# Patient Record
Sex: FEMALE | Race: WHITE | Employment: UNEMPLOYED | URBAN - METROPOLITAN AREA
[De-identification: names, ages, dates, MRNs, and addresses within clinical notes are randomized per-mention and may not be internally consistent; named-entity substitution may affect disease eponyms.]

---

## 2020-12-04 ENCOUNTER — HOSPITAL ENCOUNTER (EMERGENCY)
Age: 25
Discharge: HOME | End: 2020-12-04
Attending: EMERGENCY MEDICINE

## 2020-12-04 VITALS
TEMPERATURE: 98.1 F | RESPIRATION RATE: 17 BRPM | BODY MASS INDEX: 38.64 KG/M2 | DIASTOLIC BLOOD PRESSURE: 83 MMHG | SYSTOLIC BLOOD PRESSURE: 128 MMHG | HEART RATE: 96 BPM | OXYGEN SATURATION: 95 % | WEIGHT: 210 LBS | HEIGHT: 62 IN

## 2020-12-04 LAB
ABSOLUTE EOS #: 0 K/UL (ref 0–0.4)
ABSOLUTE IMMATURE GRANULOCYTE: ABNORMAL K/UL (ref 0–0.3)
ABSOLUTE LYMPH #: 1.8 K/UL (ref 1–4.8)
ABSOLUTE MONO #: 0.5 K/UL (ref 0.1–1.3)
ALBUMIN SERPL-MCNC: 4.8 G/DL (ref 3.5–5.2)
ALBUMIN/GLOBULIN RATIO: NORMAL (ref 1–2.5)
ALP BLD-CCNC: 84 U/L (ref 35–104)
ALT SERPL-CCNC: 22 U/L (ref 5–33)
ANION GAP SERPL CALCULATED.3IONS-SCNC: 16 MMOL/L (ref 9–17)
AST SERPL-CCNC: 17 U/L
BASOPHILS # BLD: 1 % (ref 0–2)
BASOPHILS ABSOLUTE: 0.1 K/UL (ref 0–0.2)
BILIRUB SERPL-MCNC: 0.62 MG/DL (ref 0.3–1.2)
BUN BLDV-MCNC: 14 MG/DL (ref 6–20)
BUN/CREAT BLD: NORMAL (ref 9–20)
C-REACTIVE PROTEIN: 2.4 MG/L (ref 0–5)
CALCIUM SERPL-MCNC: 9.6 MG/DL (ref 8.6–10.4)
CHLORIDE BLD-SCNC: 102 MMOL/L (ref 98–107)
CO2: 20 MMOL/L (ref 20–31)
CREAT SERPL-MCNC: 0.79 MG/DL (ref 0.5–0.9)
DIFFERENTIAL TYPE: ABNORMAL
DIRECT EXAM: NORMAL
EOSINOPHILS RELATIVE PERCENT: 0 % (ref 0–4)
GFR AFRICAN AMERICAN: >60 ML/MIN
GFR NON-AFRICAN AMERICAN: >60 ML/MIN
GFR SERPL CREATININE-BSD FRML MDRD: NORMAL ML/MIN/{1.73_M2}
GFR SERPL CREATININE-BSD FRML MDRD: NORMAL ML/MIN/{1.73_M2}
GLUCOSE BLD-MCNC: 85 MG/DL (ref 70–99)
HCG QUALITATIVE: NEGATIVE
HCT VFR BLD CALC: 45.6 % (ref 36–46)
HEMOGLOBIN: 15.9 G/DL (ref 12–16)
IMMATURE GRANULOCYTES: ABNORMAL %
LACTATE DEHYDROGENASE: 168 U/L (ref 135–214)
LIPASE: 16 U/L (ref 13–60)
LYMPHOCYTES # BLD: 20 % (ref 24–44)
Lab: NORMAL
MAGNESIUM: 2.1 MG/DL (ref 1.6–2.6)
MCH RBC QN AUTO: 29.8 PG (ref 26–34)
MCHC RBC AUTO-ENTMCNC: 35 G/DL (ref 31–37)
MCV RBC AUTO: 85.2 FL (ref 80–100)
MONOCYTES # BLD: 6 % (ref 1–7)
NRBC AUTOMATED: ABNORMAL PER 100 WBC
PDW BLD-RTO: 13.2 % (ref 11.5–14.9)
PLATELET # BLD: 202 K/UL (ref 150–450)
PLATELET ESTIMATE: ABNORMAL
PMV BLD AUTO: 9 FL (ref 6–12)
POTASSIUM SERPL-SCNC: 4 MMOL/L (ref 3.7–5.3)
RBC # BLD: 5.35 M/UL (ref 4–5.2)
RBC # BLD: ABNORMAL 10*6/UL
SEG NEUTROPHILS: 73 % (ref 36–66)
SEGMENTED NEUTROPHILS ABSOLUTE COUNT: 6.7 K/UL (ref 1.3–9.1)
SODIUM BLD-SCNC: 138 MMOL/L (ref 135–144)
SPECIMEN DESCRIPTION: NORMAL
TOTAL PROTEIN: 8.3 G/DL (ref 6.4–8.3)
WBC # BLD: 9 K/UL (ref 3.5–11)
WBC # BLD: ABNORMAL 10*3/UL

## 2020-12-04 PROCEDURE — 83690 ASSAY OF LIPASE: CPT

## 2020-12-04 PROCEDURE — 80053 COMPREHEN METABOLIC PANEL: CPT

## 2020-12-04 PROCEDURE — 87804 INFLUENZA ASSAY W/OPTIC: CPT

## 2020-12-04 PROCEDURE — 99285 EMERGENCY DEPT VISIT HI MDM: CPT

## 2020-12-04 PROCEDURE — 84703 CHORIONIC GONADOTROPIN ASSAY: CPT

## 2020-12-04 PROCEDURE — 83735 ASSAY OF MAGNESIUM: CPT

## 2020-12-04 PROCEDURE — 6370000000 HC RX 637 (ALT 250 FOR IP): Performed by: EMERGENCY MEDICINE

## 2020-12-04 PROCEDURE — 2580000003 HC RX 258: Performed by: EMERGENCY MEDICINE

## 2020-12-04 PROCEDURE — 6360000002 HC RX W HCPCS: Performed by: EMERGENCY MEDICINE

## 2020-12-04 PROCEDURE — 36415 COLL VENOUS BLD VENIPUNCTURE: CPT

## 2020-12-04 PROCEDURE — 83615 LACTATE (LD) (LDH) ENZYME: CPT

## 2020-12-04 PROCEDURE — 86140 C-REACTIVE PROTEIN: CPT

## 2020-12-04 PROCEDURE — 85025 COMPLETE CBC W/AUTO DIFF WBC: CPT

## 2020-12-04 PROCEDURE — 96374 THER/PROPH/DIAG INJ IV PUSH: CPT

## 2020-12-04 RX ORDER — ONDANSETRON 4 MG/1
4 TABLET, ORALLY DISINTEGRATING ORAL EVERY 8 HOURS PRN
Qty: 20 TABLET | Refills: 0 | Status: SHIPPED | OUTPATIENT
Start: 2020-12-04

## 2020-12-04 RX ORDER — ONDANSETRON 2 MG/ML
4 INJECTION INTRAMUSCULAR; INTRAVENOUS ONCE
Status: COMPLETED | OUTPATIENT
Start: 2020-12-04 | End: 2020-12-04

## 2020-12-04 RX ORDER — ACETAMINOPHEN 500 MG
1000 TABLET ORAL ONCE
Status: COMPLETED | OUTPATIENT
Start: 2020-12-04 | End: 2020-12-04

## 2020-12-04 RX ORDER — 0.9 % SODIUM CHLORIDE 0.9 %
1000 INTRAVENOUS SOLUTION INTRAVENOUS ONCE
Status: COMPLETED | OUTPATIENT
Start: 2020-12-04 | End: 2020-12-04

## 2020-12-04 RX ADMIN — ONDANSETRON 4 MG: 2 INJECTION INTRAMUSCULAR; INTRAVENOUS at 19:39

## 2020-12-04 RX ADMIN — SODIUM CHLORIDE 1000 ML: 9 INJECTION, SOLUTION INTRAVENOUS at 19:39

## 2020-12-04 RX ADMIN — ACETAMINOPHEN 1000 MG: 500 TABLET, FILM COATED ORAL at 21:31

## 2020-12-04 ASSESSMENT — ENCOUNTER SYMPTOMS
COUGH: 0
ABDOMINAL PAIN: 0
DIARRHEA: 1
CONSTIPATION: 0
SORE THROAT: 0
NAUSEA: 1
TROUBLE SWALLOWING: 0
COLOR CHANGE: 0
VOMITING: 1
BACK PAIN: 0
SHORTNESS OF BREATH: 0
BLOOD IN STOOL: 0

## 2020-12-04 ASSESSMENT — PAIN SCALES - GENERAL: PAINLEVEL_OUTOF10: 5

## 2020-12-04 NOTE — ED PROVIDER NOTES
[address]  EMERGENCY DEPARTMENT ENCOUNTER    Pt Name: Radha Price  MRN: [id number]  YOB: 1995  Date of evaluation:12/4/20  PCP: No primary care provider on file. CHIEF COMPLAINT       Chief Complaint   Patient presents with    Nausea    Emesis    Diarrhea       HISTORY OF PRESENT ILLNESS    Radha Price is a 31 y.o. female who presents with a chief complaint of nausea, vomiting and diarrhea. Patient states she is been sick for about 5 days. Denies any abdominal pain. Emesis and diarrhea has been nonbloody. She reports a mild headache as well. No fevers or chills. No chest pain, cough or difficulty breathing. States she has not been around anybody in several weeks. No nasal congestion, sore throat. She did not get a flu shot this year. Denies any myalgias but states that she feels like she has \"the flu. \"  Symptoms are acute. Symptoms are moderate. Nothing make symptoms better or worse. Patient has no other complaints at this time. REVIEW OF SYSTEMS       Review of Systems   Constitutional: Negative for chills, fatigue and fever. HENT: Negative for congestion, ear pain, sore throat and trouble swallowing. Eyes: Negative for visual disturbance. Respiratory: Negative for cough and shortness of breath. Cardiovascular: Negative for chest pain, palpitations and leg swelling. Gastrointestinal: Positive for diarrhea, nausea and vomiting. Negative for abdominal pain, blood in stool and constipation. Genitourinary: Negative for dysuria and flank pain. Musculoskeletal: Negative for arthralgias, back pain, myalgias and neck pain. Skin: Negative for color change, rash and wound. Neurological: Positive for headaches. Negative for dizziness, weakness, light-headedness and numbness. Psychiatric/Behavioral: Negative for confusion. All other systems reviewed and are negative. Negative in 10 essential Systems except as mentioned above and in the HPI.         PAST MEDICAL HISTORY   History reviewed. No pertinent past medical history. None    SURGICAL HISTORY      has a past surgical history that includes Tonsillectomy. None    CURRENT MEDICATIONS       Previous Medications    No medications on file       ALLERGIES     is allergic to asa [aspirin]. FAMILY HISTORY     has no family status information on file. Noncontributory  family history is not on file. SOCIAL HISTORY      reports that she has quit smoking. She has never used smokeless tobacco. She reports previous alcohol use. She reports that she does not use drugs. PHYSICAL EXAM     INITIAL VITALS:  height is 5' 2\" (1.575 m) and weight is 210 lb (95.3 kg). Her oral temperature is 98.1 °F (36.7 °C). Her blood pressure is 128/83 and her pulse is 96. Her respiration is 17 and oxygen saturation is 95%. Physical Exam  Vitals signs and nursing note reviewed. Constitutional:       General: She is not in acute distress. HENT:      Head: Normocephalic and atraumatic. Eyes:      Conjunctiva/sclera: Conjunctivae normal.      Pupils: Pupils are equal, round, and reactive to light. Neck:      Musculoskeletal: Neck supple. Cardiovascular:      Rate and Rhythm: Normal rate and regular rhythm. Heart sounds: Normal heart sounds. No murmur. Pulmonary:      Effort: Pulmonary effort is normal. No respiratory distress. Breath sounds: Normal breath sounds. Abdominal:      General: Bowel sounds are normal. There is no distension. Palpations: Abdomen is soft. Tenderness: There is no abdominal tenderness. Musculoskeletal:         General: No tenderness. Lymphadenopathy:      Cervical: No cervical adenopathy. Skin:     General: Skin is warm and dry. Findings: No rash. Neurological:      Mental Status: She is alert and oriented to person, place, and time.    Psychiatric:         Judgment: Judgment normal.           DIFFERENTIAL DIAGNOSIS/MDM:   79-year-old female presents with 5 days of nausea, vomiting diarrhea. She is afebrile, nontoxic, normal vital signs here. No acute distress. On my exam she has no abdominal tenderness. Suspect most likely viral syndrome, gastroenteritis. I doubt appendicitis, biliary colic, cholecystitis or pancreatitis. We will give IV fluids, antiemetics and reassess. DIAGNOSTIC RESULTS     EKG: All EKG's are interpreted by the Emergency Department Physician who either signs or Co-signs this chart in the absence of a cardiologist.        RADIOLOGY:   I directly visualized the following  images and reviewed the radiologist interpretations:  No orders to display           ED BEDSIDE ULTRASOUND:      LABS:  Labs Reviewed   CBC WITH AUTO DIFFERENTIAL - Abnormal; Notable for the following components:       Result Value    RBC 5.35 (*)     Seg Neutrophils 73 (*)     Lymphocytes 20 (*)     All other components within normal limits   RAPID INFLUENZA A/B ANTIGENS   COMPREHENSIVE METABOLIC PANEL   LIPASE   LACTATE DEHYDROGENASE   C-REACTIVE PROTEIN   HCG, SERUM, QUALITATIVE   MAGNESIUM   URINE RT REFLEX TO CULTURE         EMERGENCY DEPARTMENT COURSE:   Vitals:    Vitals:    12/04/20 1824   BP: 128/83   Pulse: 96   Resp: 17   Temp: 98.1 °F (36.7 °C)   TempSrc: Oral   SpO2: 95%   Weight: 210 lb (95.3 kg)   Height: 5' 2\" (1.575 m)     10:23 PM EST  Work-up is mostly unremarkable here. Lab work is completely normal.  She is tolerating orals at this time. Likely with a viral gastroenteritis. Very low suspicion for Covid however patient understands is still a possibility. We will discharge home with short prescription of Zofran. Advised to keep her well-hydrated. Advised to return if any symptoms worsen she is agreeable with plan will be discharged home today. CRITICALCARE:      CONSULTS:  None      PROCEDURES:      FINAL IMPRESSION      1.  Viral gastroenteritis            DISPOSITION/PLAN   DISPOSITION Decision To Discharge 12/04/2020 09:30:52 PM          PATIENT REFERRED TO:  [facility] ED  [address] [facility]  [phone]    As needed, If symptoms worsen      DISCHARGE MEDICATIONS:  New Prescriptions    ONDANSETRON (ZOFRAN ODT) 4 MG DISINTEGRATING TABLET    Take 1 tablet by mouth every 8 hours as needed for Nausea       (Please note that portions ofthis note were completed with a voice recognition program.  Efforts were made to edit the dictations but occasionally words are mis-transcribed.)    Daily Velasco DO  Attending Emergency Physician          Daily Velasco DO  12/04/20 4120

## 2020-12-05 NOTE — ED NOTES
Mode of arrival (squad #, walk in, police, etc) : walk in        Chief complaint(s): c/o nausea, emesis and diarrhea        Arrival Note (brief scenario, treatment PTA, etc). : Pt presents to ED from home c/o nausea, emesis and diarrhea x 5days. Pt stated she has not been able to keep anything down including liquids. Pt is A&Ox4, eupneic, PWD. GCS=15. Call light in reach. C= \"Have you ever felt that you should Cut down on your drinking? \"  No  A= \"Have people Annoyed you by criticizing your drinking? \"  No  G= \"Have you ever felt bad or Guilty about your drinking? \"  No  E= \"Have you ever had a drink as an Eye-opener first thing in the morning to steady your nerves or to help a hangover? \"  No      Deferred []      Reason for deferring: N/A    *If yes to two or more: probable alcohol abuse. Anish Briceno RN  12/04/20 3637

## 2020-12-05 NOTE — ED NOTES
Report given to [facility], RN from ED. Report method in person   The following was reviewed with receiving RN:   Current vital signs:  /83   Pulse 96   Temp 98.1 °F (36.7 °C) (Oral)   Resp 17   Ht 5' 2\" (1.575 m)   Wt 210 lb (95.3 kg)   LMP 11/13/2020   SpO2 95%   BMI 38.41 kg/m²                MEWS Score: 1     Any medication or safety alerts were reviewed. Any pending diagnostics and notifications were also reviewed, as well as any safety concerns or issues, abnormal labs, abnormal imaging, and abnormal assessment findings. Questions were answered.             Maggy Vasquez RN  12/04/20 1393

## 2020-12-05 NOTE — ED NOTES
Pt stated she is nausea free and has developed a headache. RN to notify Dr. Rama Shetty.       Nydia Ceron RN  12/04/20 7289

## 2023-04-05 ENCOUNTER — HOSPITAL ENCOUNTER (INPATIENT)
Age: 28
LOS: 2 days | Discharge: HOME | End: 2023-04-07
Attending: INTERNAL MEDICINE
Payer: COMMERCIAL

## 2023-04-05 ENCOUNTER — APPOINTMENT (OUTPATIENT)
Dept: GENERAL RADIOLOGY | Age: 28
End: 2023-04-05
Attending: INTERNAL MEDICINE
Payer: COMMERCIAL

## 2023-04-05 PROBLEM — D68.00 VON WILLEBRAND DISEASE (HCC): Status: ACTIVE | Noted: 2023-04-05

## 2023-04-05 PROBLEM — G35 MULTIPLE SCLEROSIS (HCC): Status: RESOLVED | Noted: 2023-04-05 | Resolved: 2023-04-05

## 2023-04-05 PROBLEM — Z22.7 TB LUNG, LATENT: Status: ACTIVE | Noted: 2023-04-05

## 2023-04-05 PROBLEM — M27.40 MAXILLARY CYST: Status: ACTIVE | Noted: 2023-04-05

## 2023-04-05 PROBLEM — G35 EXACERBATION OF MULTIPLE SCLEROSIS (HCC): Status: ACTIVE | Noted: 2023-04-05

## 2023-04-05 PROBLEM — G35 MULTIPLE SCLEROSIS (HCC): Status: ACTIVE | Noted: 2023-04-05

## 2023-04-05 PROBLEM — G35 EXACERBATION OF MULTIPLE SCLEROSIS (HCC): Status: RESOLVED | Noted: 2023-04-05 | Resolved: 2023-04-05

## 2023-04-05 LAB
ALBUMIN SERPL-MCNC: 4 G/DL (ref 3.5–5.2)
ANION GAP SERPL CALCULATED.3IONS-SCNC: 9 MMOL/L (ref 9–17)
BUN SERPL-MCNC: 10 MG/DL (ref 6–20)
CALCIUM SERPL-MCNC: 8.8 MG/DL (ref 8.6–10.4)
CHLORIDE SERPL-SCNC: 107 MMOL/L (ref 98–107)
CO2 SERPL-SCNC: 21 MMOL/L (ref 20–31)
CREAT SERPL-MCNC: 0.49 MG/DL (ref 0.5–0.9)
GFR SERPL CREATININE-BSD FRML MDRD: >60 ML/MIN/1.73M2
GLUCOSE SERPL-MCNC: 137 MG/DL (ref 70–99)
HCT VFR BLD AUTO: 42 % (ref 36.3–47.1)
HGB BLD-MCNC: 13.8 G/DL (ref 11.9–15.1)
MAGNESIUM SERPL-MCNC: 2.1 MG/DL (ref 1.6–2.6)
MCH RBC QN AUTO: 28.8 PG (ref 25.2–33.5)
MCHC RBC AUTO-ENTMCNC: 32.9 G/DL (ref 28.4–34.8)
MCV RBC AUTO: 87.5 FL (ref 82.6–102.9)
NRBC AUTOMATED: 0 PER 100 WBC
PDW BLD-RTO: 12.5 % (ref 11.8–14.4)
PHOSPHATE SERPL-MCNC: 2.6 MG/DL (ref 2.6–4.5)
PLATELET # BLD AUTO: 198 K/UL (ref 138–453)
PMV BLD AUTO: 11 FL (ref 8.1–13.5)
POTASSIUM SERPL-SCNC: 3.8 MMOL/L (ref 3.7–5.3)
RBC # BLD: 4.8 M/UL (ref 3.95–5.11)
SODIUM SERPL-SCNC: 137 MMOL/L (ref 135–144)
WBC # BLD AUTO: 7.4 K/UL (ref 3.5–11.3)

## 2023-04-05 PROCEDURE — 6360000002 HC RX W HCPCS

## 2023-04-05 PROCEDURE — 2580000003 HC RX 258

## 2023-04-05 PROCEDURE — 2060000000 HC ICU INTERMEDIATE R&B

## 2023-04-05 PROCEDURE — 85027 COMPLETE CBC AUTOMATED: CPT

## 2023-04-05 PROCEDURE — 2580000003 HC RX 258: Performed by: INTERNAL MEDICINE

## 2023-04-05 PROCEDURE — 80069 RENAL FUNCTION PANEL: CPT

## 2023-04-05 PROCEDURE — 99223 1ST HOSP IP/OBS HIGH 75: CPT | Performed by: PSYCHIATRY & NEUROLOGY

## 2023-04-05 PROCEDURE — 71045 X-RAY EXAM CHEST 1 VIEW: CPT

## 2023-04-05 PROCEDURE — 6370000000 HC RX 637 (ALT 250 FOR IP): Performed by: INTERNAL MEDICINE

## 2023-04-05 PROCEDURE — 83735 ASSAY OF MAGNESIUM: CPT

## 2023-04-05 PROCEDURE — 99285 EMERGENCY DEPT VISIT HI MDM: CPT

## 2023-04-05 RX ORDER — SODIUM CHLORIDE 0.9 % (FLUSH) 0.9 %
5-40 SYRINGE (ML) INJECTION EVERY 12 HOURS SCHEDULED
Status: DISCONTINUED | OUTPATIENT
Start: 2023-04-05 | End: 2023-04-07 | Stop reason: HOSPADM

## 2023-04-05 RX ORDER — ONDANSETRON 4 MG/1
4 TABLET, ORALLY DISINTEGRATING ORAL EVERY 8 HOURS PRN
Status: DISCONTINUED | OUTPATIENT
Start: 2023-04-05 | End: 2023-04-07 | Stop reason: HOSPADM

## 2023-04-05 RX ORDER — ACETAMINOPHEN 325 MG/1
650 TABLET ORAL EVERY 6 HOURS PRN
Status: DISCONTINUED | OUTPATIENT
Start: 2023-04-05 | End: 2023-04-07 | Stop reason: HOSPADM

## 2023-04-05 RX ORDER — SODIUM CHLORIDE 9 MG/ML
INJECTION, SOLUTION INTRAVENOUS PRN
Status: DISCONTINUED | OUTPATIENT
Start: 2023-04-05 | End: 2023-04-07 | Stop reason: HOSPADM

## 2023-04-05 RX ORDER — ACETAMINOPHEN 650 MG/1
650 SUPPOSITORY RECTAL EVERY 6 HOURS PRN
Status: DISCONTINUED | OUTPATIENT
Start: 2023-04-05 | End: 2023-04-07 | Stop reason: HOSPADM

## 2023-04-05 RX ORDER — SODIUM CHLORIDE 0.9 % (FLUSH) 0.9 %
5-40 SYRINGE (ML) INJECTION PRN
Status: DISCONTINUED | OUTPATIENT
Start: 2023-04-05 | End: 2023-04-07 | Stop reason: HOSPADM

## 2023-04-05 RX ORDER — POLYETHYLENE GLYCOL 3350 17 G/17G
17 POWDER, FOR SOLUTION ORAL DAILY PRN
Status: DISCONTINUED | OUTPATIENT
Start: 2023-04-05 | End: 2023-04-07 | Stop reason: HOSPADM

## 2023-04-05 RX ORDER — ONDANSETRON 2 MG/ML
4 INJECTION INTRAMUSCULAR; INTRAVENOUS EVERY 6 HOURS PRN
Status: DISCONTINUED | OUTPATIENT
Start: 2023-04-05 | End: 2023-04-07 | Stop reason: HOSPADM

## 2023-04-05 RX ORDER — ENOXAPARIN SODIUM 100 MG/ML
40 INJECTION SUBCUTANEOUS DAILY
Status: DISCONTINUED | OUTPATIENT
Start: 2023-04-05 | End: 2023-04-07 | Stop reason: HOSPADM

## 2023-04-05 RX ADMIN — SODIUM CHLORIDE, PRESERVATIVE FREE 10 ML: 5 INJECTION INTRAVENOUS at 20:05

## 2023-04-05 RX ADMIN — ACETAMINOPHEN 650 MG: 325 TABLET ORAL at 18:34

## 2023-04-05 RX ADMIN — METHYLPREDNISOLONE SODIUM SUCCINATE 1000 MG: 1 INJECTION, POWDER, LYOPHILIZED, FOR SOLUTION INTRAMUSCULAR; INTRAVENOUS at 17:50

## 2023-04-05 RX ADMIN — SODIUM CHLORIDE: 9 INJECTION, SOLUTION INTRAVENOUS at 17:49

## 2023-04-05 ASSESSMENT — ENCOUNTER SYMPTOMS
PHOTOPHOBIA: 0
ABDOMINAL DISTENTION: 0
RHINORRHEA: 0
NAUSEA: 1
BACK PAIN: 0
SORE THROAT: 0
ABDOMINAL PAIN: 0
SHORTNESS OF BREATH: 0
CHOKING: 0
COLOR CHANGE: 0
DIARRHEA: 0
CONSTIPATION: 0
WHEEZING: 0
COUGH: 0

## 2023-04-05 ASSESSMENT — PAIN SCALES - GENERAL
PAINLEVEL_OUTOF10: 4
PAINLEVEL_OUTOF10: 0
PAINLEVEL_OUTOF10: 2
PAINLEVEL_OUTOF10: 4

## 2023-04-05 ASSESSMENT — PAIN DESCRIPTION - LOCATION
LOCATION: ABDOMEN
LOCATION: HEAD
LOCATION: GENERALIZED

## 2023-04-05 ASSESSMENT — PAIN DESCRIPTION - ONSET: ONSET: ON-GOING

## 2023-04-05 ASSESSMENT — PAIN - FUNCTIONAL ASSESSMENT: PAIN_FUNCTIONAL_ASSESSMENT: 0-10

## 2023-04-05 ASSESSMENT — PAIN DESCRIPTION - FREQUENCY
FREQUENCY: INTERMITTENT
FREQUENCY: INTERMITTENT

## 2023-04-05 ASSESSMENT — PAIN DESCRIPTION - PAIN TYPE
TYPE: ACUTE PAIN
TYPE: ACUTE PAIN

## 2023-04-05 ASSESSMENT — PAIN DESCRIPTION - DESCRIPTORS
DESCRIPTORS: PRESSURE
DESCRIPTORS: ACHING

## 2023-04-05 ASSESSMENT — PAIN DESCRIPTION - ORIENTATION
ORIENTATION: POSTERIOR
ORIENTATION: ANTERIOR

## 2023-04-05 NOTE — CONSULTS
[facility]  Office: [address], DO, Karri Hernandez, DO, Deborah July, DO, Reny Hernandez Blood, DO, Blake Estrada MD, Alejandra Maurice MD, Raquel Hairston MD, Sylvie Bernheim, MD,  Julianna Novka MD, Olivia Cornelius MD, Jacqueline Burch, DO, Henny Martin MD,  Jessica Bergman MD, Virgil Carlos MD, Isabella Fenton DO, Lyla Velez MD, Eric Andrews MD, Sadia Garza DO, Migue Pettit MD, Bala Dolan MD, Kiara Sinclair MD, Maddy Marquez MD,  Chioma Gracia DO, Harper Nam MD,  Kelly Ramos CNP,  Becka Callejas, CNP, Ubaldo Azar, CNP, Alfa Joyce, CNP,  Edith Bird, DNP, Jayro Vargas, CNP, Nelly Morrow, CNP, Russel Rojo, CNP, Karen Garcia, CNP, Jeanie Gonzalez, CNP, Tony Yancey PA-C, Christina Wells, CNS, Nallely Del Toro, CNP, Cecile Sood, CNP         [address] / HISTORY AND PHYSICAL EXAMINATION            Date:   4/5/2023  Patient name:  Armin Cintron  Date of admission:  4/5/2023  2:11 PM  MRN:   [id number]  Account:  [de-identified]  YOB: 1995  PCP:    No primary care provider on file. Room:   [address] [place] [address]  Code Status:    Full Code    Physician Requesting Consult: Jacqueline Burch DO    Reason for Consult:  medical management. Chief Complaint:     Chief Complaint   Patient presents with    Multiple Sclerosis    Fatigue       History Obtained From:     patient    History of Present Illness:     79-year-old female presents to the emergency department for weakness that started the previous night. Patient was seen at an outside emergency room and transferred for close evaluation by neurology. Patient has been following with [facility] for her MS, after speak with neurology service was recommended transfer after initiation of steroids.   Patient upon arrival has drastically improved symptoms, patient initially was lethargic and tired, however now

## 2023-04-05 NOTE — ED NOTES
Pt to ED as transfer from [facility] for MS flare-up. Pt reports weakness in all extremities that started last night. Patient alert and oriented x4, talking in complete sentences. Respirations even and unlabored. Patient placed on continuous cardiac monitoring, BP cuff, and pulse ox.  Call light in reach, all needs met at this time     Christy Ross RN  04/05/23 3621

## 2023-04-05 NOTE — H&P
y.o. Kenziedanial Lugolem / non  female past medical history of MS on Ocrevus, von Willebrand factor disease, latent TB, presented with generalized fatigue, worsening lower extremity weakness, extremity numbness, syncope, vertigo over the last 3 days. Impression: Possible pseudoexacerbation, syncope    Plan:     IV Solu-Medrol 1 g IV for 3 days  Routine EEG to rule out seizure  Tylenol as needed for headache  Consider using magnesium for headache as well  We will hold off on imaging at this moment      Consultations:   [place] TO DUAEN TEAM      Follow-up further recommendations after discussing the case with attending  The plan was discussed with the patient, patient's family and the medical staff. Patient is admitted as inpatient status because of co-morbidities listed above, severity of signs and symptoms as outlined, requirement for current medical therapies and most importantly because of direct risk to patient if care not provided in a hospital setting. Burgess Faraz MD  Neurology Resident PGY-2  4/5/2023  7:23 PM    Copy sent to Dr. Cardoza primary care provider on file.

## 2023-04-05 NOTE — ED NOTES
Pt transported to  in stretcher by [employer], [address] [facility], RN  04/05/23 1568

## 2023-04-06 PROBLEM — R55 VASOVAGAL SYNCOPE: Status: ACTIVE | Noted: 2023-04-06

## 2023-04-06 LAB
25(OH)D3 SERPL-MCNC: 8 NG/ML
FOLATE SERPL-MCNC: 13 NG/ML
VIT B12 SERPL-MCNC: 345 PG/ML (ref 232–1245)

## 2023-04-06 PROCEDURE — 2580000003 HC RX 258: Performed by: INTERNAL MEDICINE

## 2023-04-06 PROCEDURE — 6360000002 HC RX W HCPCS

## 2023-04-06 PROCEDURE — 82607 VITAMIN B-12: CPT

## 2023-04-06 PROCEDURE — 82306 VITAMIN D 25 HYDROXY: CPT

## 2023-04-06 PROCEDURE — 99232 SBSQ HOSP IP/OBS MODERATE 35: CPT | Performed by: PSYCHIATRY & NEUROLOGY

## 2023-04-06 PROCEDURE — 95816 EEG AWAKE AND DROWSY: CPT

## 2023-04-06 PROCEDURE — 6370000000 HC RX 637 (ALT 250 FOR IP): Performed by: INTERNAL MEDICINE

## 2023-04-06 PROCEDURE — 2060000000 HC ICU INTERMEDIATE R&B

## 2023-04-06 PROCEDURE — 36415 COLL VENOUS BLD VENIPUNCTURE: CPT

## 2023-04-06 PROCEDURE — 99232 SBSQ HOSP IP/OBS MODERATE 35: CPT | Performed by: INTERNAL MEDICINE

## 2023-04-06 PROCEDURE — 82746 ASSAY OF FOLIC ACID SERUM: CPT

## 2023-04-06 PROCEDURE — 2580000003 HC RX 258

## 2023-04-06 RX ORDER — BISACODYL 5 MG/1
5 TABLET, DELAYED RELEASE ORAL DAILY PRN
Status: DISCONTINUED | OUTPATIENT
Start: 2023-04-06 | End: 2023-04-07 | Stop reason: HOSPADM

## 2023-04-06 RX ORDER — DOCUSATE SODIUM 100 MG/1
100 CAPSULE, LIQUID FILLED ORAL 2 TIMES DAILY
Status: DISCONTINUED | OUTPATIENT
Start: 2023-04-06 | End: 2023-04-07 | Stop reason: HOSPADM

## 2023-04-06 RX ORDER — MAGNESIUM SULFATE 1 G/100ML
1000 INJECTION INTRAVENOUS ONCE
Status: COMPLETED | OUTPATIENT
Start: 2023-04-06 | End: 2023-04-06

## 2023-04-06 RX ADMIN — SODIUM CHLORIDE: 9 INJECTION, SOLUTION INTRAVENOUS at 11:52

## 2023-04-06 RX ADMIN — SODIUM CHLORIDE: 9 INJECTION, SOLUTION INTRAVENOUS at 07:57

## 2023-04-06 RX ADMIN — METHYLPREDNISOLONE SODIUM SUCCINATE 1000 MG: 1 INJECTION, POWDER, LYOPHILIZED, FOR SOLUTION INTRAMUSCULAR; INTRAVENOUS at 07:58

## 2023-04-06 RX ADMIN — SODIUM CHLORIDE, PRESERVATIVE FREE 10 ML: 5 INJECTION INTRAVENOUS at 07:58

## 2023-04-06 RX ADMIN — SODIUM CHLORIDE, PRESERVATIVE FREE 10 ML: 5 INJECTION INTRAVENOUS at 20:54

## 2023-04-06 RX ADMIN — DOCUSATE SODIUM 100 MG: 100 CAPSULE ORAL at 20:21

## 2023-04-06 RX ADMIN — MAGNESIUM SULFATE HEPTAHYDRATE 1000 MG: 1 INJECTION, SOLUTION INTRAVENOUS at 11:52

## 2023-04-06 RX ADMIN — DOCUSATE SODIUM 100 MG: 100 CAPSULE ORAL at 14:01

## 2023-04-06 ASSESSMENT — ENCOUNTER SYMPTOMS
SHORTNESS OF BREATH: 0
ABDOMINAL DISTENTION: 0
PHOTOPHOBIA: 0
WHEEZING: 0
COUGH: 0
COLOR CHANGE: 0
CONSTIPATION: 0
CHOKING: 0
BACK PAIN: 0
SORE THROAT: 0
NAUSEA: 1
RHINORRHEA: 0
DIARRHEA: 0
ABDOMINAL PAIN: 0

## 2023-04-06 ASSESSMENT — PAIN DESCRIPTION - DESCRIPTORS
DESCRIPTORS: ACHING
DESCRIPTORS: ACHING

## 2023-04-06 ASSESSMENT — PAIN DESCRIPTION - LOCATION
LOCATION: HEAD
LOCATION: HEAD

## 2023-04-06 ASSESSMENT — PAIN SCALES - GENERAL: PAINLEVEL_OUTOF10: 4

## 2023-04-06 NOTE — PROCEDURES
EEG REPORT       Patient: Delos Cooks Age: 28 y.o. MRN: [id number]      Referring Provider: Aurelio Day MD    History: This routine 30 minute scalp EEG was recorded with video- monitoring for a 28 y.o. Maribel Bañuelos female  who presented with history of loss of consciousness. This EEG was performed to evaluate for focal and epileptiform abnormalities. Delos Cooks   Current Facility-Administered Medications   Medication Dose Route Frequency Provider Last Rate Last Admin    magnesium sulfate 1000 mg in dextrose 5% 100 mL IVPB  1,000 mg IntraVENous Once Cindi Maya MD        sodium chloride flush 0.9 % injection 5-40 mL  5-40 mL IntraVENous 2 times per day Pamla Spare, DO   10 mL at 04/06/23 0758    sodium chloride flush 0.9 % injection 5-40 mL  5-40 mL IntraVENous PRN Pamla Spare, DO        0.9 % sodium chloride infusion   IntraVENous PRN Pamla Spare, DO 10 mL/hr at 04/06/23 0757 New Bag at 04/06/23 0757    enoxaparin (LOVENOX) injection 40 mg  40 mg SubCUTAneous Daily Pamla Spare, DO        ondansetron (ZOFRAN-ODT) disintegrating tablet 4 mg  4 mg Oral Q8H PRN Pamla Spare, DO        Or    ondansetron [facility]) injection 4 mg  4 mg IntraVENous Q6H PRN Pamla Spare, DO        polyethylene glycol (GLYCOLAX) packet 17 g  17 g Oral Daily PRN Pamla Spare, DO        acetaminophen (TYLENOL) tablet 650 mg  650 mg Oral Q6H PRN Pamla Spare, DO   650 mg at 04/05/23 9259    Or    acetaminophen (TYLENOL) suppository 650 mg  650 mg Rectal Q6H PRN Pamla Spare, DO        methylPREDNISolone sodium (SOLU-MEDROL) 1,000 mg in sodium chloride 0.9 % 250 mL IVPB  1,000 mg IntraVENous Daily Cindi Maya MD   Stopped at 04/06/23 9142        Technical Description: This is a 21 channel digital EEG recording with time-locked video. Electrodes were placed in accordance with the 10-20 International System of Electrode Placement. Single lead EKG monitoring as well as temporal electrodes were included. The patient was not sleep deprived.  This

## 2023-04-06 NOTE — PROGRESS NOTES
[facility] Neurology   [address] [place] [address]    Progress Note             Date:   4/6/2023  Patient name:  Lisy Branch  Date of admission:  4/5/2023  2:11 PM  MRN:   [id number]  Account:  [de-identified]  YOB: 1995  PCP:    No primary care provider on file. Room:   [address] [place] [address]  Code Status:    Full Code    Chief Complaint:     Chief Complaint   Patient presents with    Multiple Sclerosis    Fatigue       Interval hx: The patient was seen and examined at bedside. Is vitally stable, alert and oriented x 3. No acute events overnight. The patient stated that she is feeling much better today  Numbness of bilateral lower extremity has improved   Still has tingling of hands bilaterally       Brief History of Present Illness: This is a 26-year-old female past medical history of multiple sclerosis, von Willebrand factor disease, latent TB, presented as a transfer from [facility] for possible MS flare. Patient presented with feeling generalized fatigue, lethargic, she also complained of bilateral lower extremity weakness and numbness over the last 3 days. She noticed symptoms are more notable on the left side lower extremity. She lives with her fiancé and her sister and states that she had loss of consciousness about 6 times according to them prior to presentation, also reports having spinning sensation for which she was laying on her back for the whole day, complain of headache 4/10, dull, nonradiating, partially relieved with Tylenol, patient is unable to take NSAIDs due to risk of bleeding since she has von Willebrand factor disease. Also states having nausea,but has not vomited. She denies any fever or chills, denies any dysuria, urgency or frequency, denies URTI symptoms. Initial vital signs at [facility] with blood pressure 115/95, afebrile. CBC, CMP, UA, lactic acid all were within normal limits.   CT head showing changes consistent with myelinating

## 2023-04-06 NOTE — PROGRESS NOTES
[facility]  Office: [address], DO, Jean Vasquez, DO, Deidra Garner, DO, Thurmond Matsmoira Ocampo, DO, Velton Habermann, MD, Estephania Murdock MD, Kelsey Celestin MD, Roselyn Figueroa MD,  Lissa Griffiths MD, Michelle Joseph MD, Constance Gibson, DO, Stephanie Clancy MD,  Ivette Batista MD, Alice Kat MD, Remigio Jacobson DO, Kervin Jenkins MD, Yolande Lofton MD, Sandy Aldridge DO, Jagruti Gurrola MD, Stef Hoang MD, Adilia Somers MD, Eladio Byrd MD,  Fili Bejarano, DO, Maddie Aranda MD,  Prudencio Mclean, CNP,  Harreit Sanchez, CNP, Edie Rhodes, CNP, Lynnette Bates, CNP,  Serena Haddad, [facility], Simi Fletcher, CNP, Bryan Orozco, CNP, Russel Brown, CNP, Kimmie Garcia, CNP, Tanesha Sandoval, CNP, Alli Blanco, PA-C, Ubaldo Horton, CNS, Toni Drew, CNP, Lucille Yen, CNP         [address]    Progress Note    4/6/2023    1:17 PM    Name:   Sherri Jackson  MRN:     [id number]     Acct:      [de-identified]   Room:   [address] [place] [address] Day:  1  Admit Date:  4/5/2023  2:11 PM    PCP:   No primary care provider on file. Code Status:  Full Code    Subjective:     C/C:   Chief Complaint   Patient presents with    Multiple Sclerosis    Fatigue     Interval History Status: improved. Feels better today after starting steroids    Denies cp/sob/n/v    Had 4-5 episodes brief loc at home while straining to have bm-has been constipated    Brief History:     Per my partner:  \"28year-old female presents to the emergency department for weakness that started the previous night. Patient was seen at an outside emergency room and transferred for close evaluation by neurology. Patient has been following with [facility] for her MS, after speak with neurology service was recommended transfer after initiation of steroids.   Patient upon arrival has drastically improved symptoms, patient initially was lethargic and tired, however

## 2023-04-07 VITALS
RESPIRATION RATE: 13 BRPM | SYSTOLIC BLOOD PRESSURE: 101 MMHG | HEART RATE: 100 BPM | BODY MASS INDEX: 38.09 KG/M2 | TEMPERATURE: 99.1 F | WEIGHT: 207 LBS | HEIGHT: 62 IN | OXYGEN SATURATION: 100 % | DIASTOLIC BLOOD PRESSURE: 59 MMHG

## 2023-04-07 PROCEDURE — 2580000003 HC RX 258

## 2023-04-07 PROCEDURE — 99239 HOSP IP/OBS DSCHRG MGMT >30: CPT | Performed by: PSYCHIATRY & NEUROLOGY

## 2023-04-07 PROCEDURE — 2580000003 HC RX 258: Performed by: INTERNAL MEDICINE

## 2023-04-07 PROCEDURE — 6360000002 HC RX W HCPCS

## 2023-04-07 PROCEDURE — 95819 EEG AWAKE AND ASLEEP: CPT | Performed by: PSYCHIATRY & NEUROLOGY

## 2023-04-07 RX ORDER — ERGOCALCIFEROL 1.25 MG/1
50000 CAPSULE ORAL WEEKLY
Qty: 4 CAPSULE | Refills: 0 | Status: SHIPPED | OUTPATIENT
Start: 2023-04-07

## 2023-04-07 RX ADMIN — SODIUM CHLORIDE: 9 INJECTION, SOLUTION INTRAVENOUS at 09:25

## 2023-04-07 RX ADMIN — METHYLPREDNISOLONE SODIUM SUCCINATE 1000 MG: 1 INJECTION, POWDER, LYOPHILIZED, FOR SOLUTION INTRAMUSCULAR; INTRAVENOUS at 09:25

## 2023-04-07 RX ADMIN — SODIUM CHLORIDE, PRESERVATIVE FREE 10 ML: 5 INJECTION INTRAVENOUS at 09:27

## 2023-04-07 ASSESSMENT — ENCOUNTER SYMPTOMS
BACK PAIN: 0
COLOR CHANGE: 0
NAUSEA: 1
PHOTOPHOBIA: 0
DIARRHEA: 0
SORE THROAT: 0
ABDOMINAL DISTENTION: 0
SHORTNESS OF BREATH: 0
ABDOMINAL PAIN: 0
CONSTIPATION: 0
WHEEZING: 0
COUGH: 0
RHINORRHEA: 0
CHOKING: 0

## 2023-04-07 NOTE — PROGRESS NOTES
CLINICAL PHARMACY NOTE: MEDS TO BEDS    Total # of Prescriptions Filled: 1   The following medications were delivered to the patient:  Vitamin D 1.25mg cap    Additional Documentation:

## 2023-04-07 NOTE — CARE COORDINATION
Discharge [facility] Case Management Department  Written by: Mimi Galeana RN    Patient Name: Diallo Nicolas  Attending Provider: Gray Beltran DO  Admit Date: 2023  2:11 PM  MRN: [id number]  Account: [de-identified]                     : 1995  Discharge Date: 23      Disposition: home    Met with patient to discuss transitional planning. Plan is home with family. Patient has all necessary DME. Patient's family to provide ride home. Patient agreeable to plan.      Mimi Galeana RN
na  Potential Assistance needed at discharge: N/A            Potential DME:    Patient expects to discharge to: [facility] for transportation at discharge: Family    Financial    Payor: [facility]  [address] / Plan: [facility]  [address] / Product Type: *No Product type* /     Does insurance require precert for SNF: Yes    Potential assistance Purchasing Medications: No  Meds-to-Beds request: Yes    No Pharmacies Listed    Notes:    Factors facilitating achievement of predicted outcomes: Family support, Friend support, Motivated, Cooperative, Pleasant, Sense of humor, Good insight into deficits, and Has needed Durable Medical Equipment at home    Barriers to discharge: na    Additional Case Management Notes: met with patient to discuss transitional planning. Plan is home with family. Patient has ride home. Patient agreeable to plan. The Plan for Transition of Care is related to the following treatment goals of Multiple sclerosis ([facility] [G35]  Exacerbation of multiple sclerosis ([facility] [X38]    IF APPLICABLE: The Patient and/or patient representative Estrella Alicia and her family were provided with a choice of provider and agrees with the discharge plan. Freedom of choice list with basic dialogue that supports the patient's individualized plan of care/goals and shares the quality data associated with the providers was provided to: Patient   Patient Representative Name:       The Patient and/or Patient Representative Agree with the Discharge Plan?  Yes    Mihir Figueroa RN  Case Management Department  Ph: [phone] Fax: na

## 2023-04-07 NOTE — DISCHARGE SUMMARY
[facility]     Department of Neurology    INPATIENT DISCHARGE SUMMARY        Patient Identification:  Yao Win is a 28 y.o. female. :  1995  MRN: [id number]     Acct: [de-identified]   Admit Date:  2023  Discharge date and time: 23    Attending Provider: Flavio Cordero DO                                     Admission Diagnoses:   Multiple sclerosis ([address] [G35]  Exacerbation of multiple sclerosis ([address] Angella Rodriguez    Discharge Diagnoses:   Principal Problem:    Multiple sclerosis exacerbation ([address]  Active Problems:    Vester Raquel disease ([address]    TB lung, latent    Maxillary cyst    Vasovagal syncope  Resolved Problems:    Multiple sclerosis ([address]       Consults:   IM    Brief Inpatient course: This is a 76-year-old female past medical history of multiple sclerosis, von Willebrand factor disease, latent TB, presented as a transfer from [facility] for possible MS flare. Patient presented with feeling generalized fatigue, lethargic, she also complained of bilateral lower extremity weakness and numbness over the last 3 days. She noticed symptoms are more notable on the left side lower extremity. She lives with her fiancé and her sister and states that she had loss of consciousness about 6 times according to them prior to presentation, also reports having spinning sensation for which she was laying on her back for the whole day, complain of headache 4/10, dull, nonradiating, partially relieved with Tylenol, patient is unable to take NSAIDs due to risk of bleeding since she has von Willebrand factor disease. Also states having nausea,but has not vomited. She denies any fever or chills, denies any dysuria, urgency or frequency, denies URTI symptoms. Initial vital signs at [facility] with blood pressure 115/95, afebrile. CBC, CMP, UA, lactic acid all were within normal limits.   CT head showing changes consistent with myelinating disease, no acute abnormalities

## 2023-04-07 NOTE — PROGRESS NOTES
[address] [facility] Neurology   [address] [place] [address]    Progress Note             Date:   4/7/2023  Patient name:  Liyah Schaefer  Date of admission:  4/5/2023  2:11 PM  MRN:   [id number]  Account:  [de-identified]  YOB: 1995  PCP:    No primary care provider on file. Room:   [address] [place] [address]  Code Status:    Full Code    Chief Complaint:     Chief Complaint   Patient presents with    Multiple Sclerosis    Fatigue       Interval hx: The patient was seen and examined at bedside. Is vitally stable, alert and oriented x 3. No acute events overnight. Improved tingling of both hands       Brief History of Present Illness: This is a 28-year-old female past medical history of multiple sclerosis, von Willebrand factor disease, latent TB, presented as a transfer from [facility] for possible MS flare. Patient presented with feeling generalized fatigue, lethargic, she also complained of bilateral lower extremity weakness and numbness over the last 3 days. She noticed symptoms are more notable on the left side lower extremity. She lives with her fiancé and her sister and states that she had loss of consciousness about 6 times according to them prior to presentation, also reports having spinning sensation for which she was laying on her back for the whole day, complain of headache 4/10, dull, nonradiating, partially relieved with Tylenol, patient is unable to take NSAIDs due to risk of bleeding since she has von Willebrand factor disease. Also states having nausea,but has not vomited. She denies any fever or chills, denies any dysuria, urgency or frequency, denies URTI symptoms. Initial vital signs at [facility] with blood pressure 115/95, afebrile. CBC, CMP, UA, lactic acid all were within normal limits. CT head showing changes consistent with myelinating disease, no acute abnormalities were noted.   She was given a dose of Solu-Medrol 25 mg IV once and was transferred to

## 2023-04-07 NOTE — PLAN OF CARE
Problem: Discharge Planning  Goal: Discharge to home or other facility with appropriate resources  4/6/2023 0233 by Alivia Brown RN  Outcome: Progressing     Problem: Pain  Goal: Verbalizes/displays adequate comfort level or baseline comfort level  4/6/2023 0233 by Alivia Brown RN  Outcome: Progressing     Problem: Safety - Adult  Goal: Free from fall injury  4/6/2023 0233 by Alivia Brown RN  Outcome: Progressing    Problem: Neurosensory - Adult  Goal: Achieves maximal functionality and self care  4/6/2023 0233 by Alivia Brown RN  Outcome: Progressing    Problem: Musculoskeletal - Adult  Goal: Return mobility to safest level of function  4/6/2023 0233 by Alivia Brown RN  Outcome: Progressing
Problem: Discharge Planning  Goal: Discharge to home or other facility with appropriate resources  Outcome: Progressing     Problem: Pain  Goal: Verbalizes/displays adequate comfort level or baseline comfort level  Outcome: Progressing     Problem: Safety - Adult  Goal: Free from fall injury  Outcome: Progressing     Problem: Neurosensory - Adult  Goal: Achieves maximal functionality and self care  Outcome: Progressing     Problem: Musculoskeletal - Adult  Goal: Return mobility to safest level of function  Outcome: Progressing
adequate protection for wounds/incisions during mobilization